# Patient Record
Sex: MALE | Race: WHITE | NOT HISPANIC OR LATINO | URBAN - METROPOLITAN AREA
[De-identification: names, ages, dates, MRNs, and addresses within clinical notes are randomized per-mention and may not be internally consistent; named-entity substitution may affect disease eponyms.]

---

## 2021-07-06 ENCOUNTER — APPOINTMENT (RX ONLY)
Dept: URBAN - METROPOLITAN AREA CLINIC 160 | Facility: CLINIC | Age: 51
Setting detail: DERMATOLOGY
End: 2021-07-06

## 2021-07-06 DIAGNOSIS — L72.8 OTHER FOLLICULAR CYSTS OF THE SKIN AND SUBCUTANEOUS TISSUE: ICD-10-CM

## 2021-07-06 PROBLEM — D48.5 NEOPLASM OF UNCERTAIN BEHAVIOR OF SKIN: Status: ACTIVE | Noted: 2021-07-06

## 2021-07-06 PROCEDURE — ? COUNSELING

## 2021-07-06 PROCEDURE — ? ORDER TESTS

## 2021-07-06 PROCEDURE — 99202 OFFICE O/P NEW SF 15 MIN: CPT

## 2021-07-06 PROCEDURE — ? MEDICATION COUNSELING

## 2021-07-06 PROCEDURE — ? PRESCRIPTION MEDICATION MANAGEMENT

## 2021-07-06 PROCEDURE — ? ADDITIONAL NOTES

## 2021-07-06 PROCEDURE — ? PRESCRIPTION

## 2021-07-06 RX ORDER — DOXYCYCLINE HYCLATE 100 MG/1
CAPSULE, GELATIN COATED ORAL
Qty: 28 | Refills: 0 | Status: ERX | COMMUNITY
Start: 2021-07-06

## 2021-07-06 RX ORDER — GENTAMICIN SULFATE 1 MG/G
OINTMENT TOPICAL
Qty: 1 | Refills: 0 | Status: ERX | COMMUNITY
Start: 2021-07-06

## 2021-07-06 RX ADMIN — DOXYCYCLINE HYCLATE: 100 CAPSULE, GELATIN COATED ORAL at 00:00

## 2021-07-06 RX ADMIN — GENTAMICIN SULFATE: 1 OINTMENT TOPICAL at 00:00

## 2021-07-06 ASSESSMENT — LOCATION SIMPLE DESCRIPTION DERM: LOCATION SIMPLE: RIGHT FOREHEAD

## 2021-07-06 ASSESSMENT — LOCATION ZONE DERM: LOCATION ZONE: FACE

## 2021-07-06 ASSESSMENT — LOCATION DETAILED DESCRIPTION DERM: LOCATION DETAILED: RIGHT INFERIOR LATERAL FOREHEAD

## 2021-07-06 NOTE — PROCEDURE: MIPS QUALITY
Quality 431: Preventive Care And Screening: Unhealthy Alcohol Use - Screening: Patient screened for unhealthy alcohol use using a single question and scores less than 2 times per year
Quality 93: Acute Otitis Externa (Aoe): Systemic Antimicrobial Therapy - Avoidance Or Inappropriate Use: Physician did NOT prescribing a systemic antibiotic for AOE
Quality 226: Preventive Care And Screening: Tobacco Use: Screening And Cessation Intervention: Tobacco Screening not Performed for Medical Reasons
Detail Level: Detailed

## 2021-07-06 NOTE — PROCEDURE: ORDER TESTS
Expected Date Of Service: 07/06/2021
Billing Type: Third-Party Bill
Performing Laboratory: [id number]
Bill For Surgical Tray: no

## 2021-07-06 NOTE — PROCEDURE: PRESCRIPTION MEDICATION MANAGEMENT
Render In Strict Bullet Format?: No
Detail Level: Zone
Initiate Treatment: Oral doxycycline, topical gentamicin, vinegar soaks tid
Plan: Instructed patient that if he develops any systemic symptoms such as fever, chills, feels unwell or if he has increased pain, swelling, or headache he should seek immediate medical attention at the ED/hospital.

## 2021-09-17 NOTE — PROCEDURE: MEDICATION COUNSELING
Await MD assessment prior to transfer to  Possibly due to pain recommend repeat ekg and morphine for pain. MD stated a new one time dose will be ordered when able for admin Ketoconazole Pregnancy And Lactation Text: This medication is Pregnancy Category C and it isn't know if it is safe during pregnancy. It is also excreted in breast milk and breast feeding isn't recommended.